# Patient Record
Sex: FEMALE | Race: BLACK OR AFRICAN AMERICAN | NOT HISPANIC OR LATINO | URBAN - METROPOLITAN AREA
[De-identification: names, ages, dates, MRNs, and addresses within clinical notes are randomized per-mention and may not be internally consistent; named-entity substitution may affect disease eponyms.]

---

## 2024-07-24 ENCOUNTER — TELEPHONE (OUTPATIENT)
Dept: ORTHOPEDIC SURGERY | Facility: CLINIC | Age: 59
End: 2024-07-24

## 2024-07-24 NOTE — TELEPHONE ENCOUNTER
Caller: JACE TENORIO  Relationship to patient: SELF  Best call back number: [phone]   Service:LANGUAGE  Is  needs updated in registration: YES  Date of Appointment:08/07/24  Time of Appointment:3:10 PM  Additional notes:PATIENT LANGUAGE IS SWAHILI

## 2024-08-07 ENCOUNTER — OFFICE VISIT (OUTPATIENT)
Dept: ORTHOPEDIC SURGERY | Facility: CLINIC | Age: 59
End: 2024-08-07
Payer: OTHER MISCELLANEOUS

## 2024-08-07 VITALS
WEIGHT: 152.4 LBS | SYSTOLIC BLOOD PRESSURE: 130 MMHG | DIASTOLIC BLOOD PRESSURE: 72 MMHG | HEIGHT: 63 IN | BODY MASS INDEX: 27 KG/M2

## 2024-08-07 DIAGNOSIS — M20.012 MALLET DEFORMITY OF LEFT LITTLE FINGER: ICD-10-CM

## 2024-08-07 DIAGNOSIS — M79.642 LEFT HAND PAIN: Primary | ICD-10-CM

## 2024-08-07 PROCEDURE — 99203 OFFICE O/P NEW LOW 30 MIN: CPT | Performed by: PLASTIC SURGERY

## 2024-08-07 NOTE — PROGRESS NOTES
[facility]     Office Visit       Date: 08/07/2024   Patient Name: Radha Smith  MRN: [id number]  YOB: 1965    Referring Physician: Leobardo Isaac PA-C     Chief Complaint:   Chief Complaint   Patient presents with    Left Hand - Pain     DOI: 03/25/2024       History of Present Illness:   Radha Smith is a 59 y.o. female hand dominant presents with a left small finger mallet deformity.  Patient was sustained a crush injury to her left small finger at work in March.  Noticed immediate extensor lag of the left small finger.  She presented to [place] [facility] was placed in the splint she splinted for approximately 2 months.  Reports that her pain associated the left small finger since resolved however she still has a slight extensor lag.  She has no other relevant medical history.  She denies smoking.      Subjective   Review of Systems:   Review of Systems   Constitutional: Negative.  Negative for chills, fatigue and fever.   HENT: Negative.  Negative for congestion and dental problem.    Eyes: Negative.  Negative for blurred vision.   Respiratory: Negative.  Negative for shortness of breath.    Cardiovascular: Negative.  Negative for leg swelling.   Gastrointestinal: Negative.  Negative for abdominal pain.   Endocrine: Negative.  Negative for polyuria.   Genitourinary: Negative.  Negative for difficulty urinating.   Musculoskeletal:  Positive for arthralgias.   Skin: Negative.    Allergic/Immunologic: Negative.    Neurological: Negative.    Hematological: Negative.  Negative for adenopathy.   Psychiatric/Behavioral: Negative.  Negative for behavioral problems.         Pertinent review of systems per HPI.     I reviewed the patient's chief complaint, history of present illness, review of systems, past medical history, surgical history, family history, social history, medications and allergy list in the EMR on  "08/07/2024 and agree with the findings above.    Objective    Vital Signs:   Vitals:    08/07/24 1523   BP: 130/72   Weight: 69.1 kg (152 lb 6.4 oz)   Height: 160 cm (63\")     BMI: Body mass index is 27 kg/m².    General Appearance: No acute distress. Alert and oriented.     Chest:  Non-labored breathing on room air. Regular rate and rhythm.    Upper Extremity Exam:    Left small finger with a 30 degree extensor lag at the DIP.  Nontender over the dorsal aspect of the DIP.  Patient has full flexion.  No evidence of swan-neck deformity.    Fingers are warm, well-perfused with appropriate capillary refill.  Palpable radial pulse.    Sensation intact to light touch in median, radial and ulnar nerve distributions.    Motor- Fires FPL, ulnar intrinsics, EPL/EDC w/ full active and passive range of motion. Strength intact.    Non-tender except for in the areas highlighted    Imaging/Studies:   Imaging Results (Last 24 Hours)       Procedure Component Value Units Date/Time    XR Hand 3+ View Left [[id number]] Resulted: 08/07/24 1540     Updated: 08/07/24 1540    Narrative:      Left Hand X-Ray    Indication: Pain    Views:  AP, Lateral, and Oblique     Comparison:  None    Findings:  Small dorsal avulsion fracture of the left small finger DIP with resulting   mallet deformity  No bony lesion  Normal soft tissues  Normal joint spaces    Impression:   Small bony mallet of the left small finger                  Procedures:  Procedures    Quality Measures:   ACP:   ACP discussion was deferred.    Tobacco:   Radha Smith  reports that she has never smoked. She has been exposed to tobacco smoke. She has never used smokeless tobacco.      Assessment / Plan    Assessment/Plan:     Diagnoses and all orders for this visit:    Left hand pain  -     XR Hand 3+ View Left         Radha Smithis a 59 y.o. female who presents with:      ICD-10-CM ICD-9-CM   1. Left hand pain  M79.642 729.5   2. Mallet deformity of left little finger  " M20.012 736.1         Patient presents with left small finger mallet deformity after a crush injury 5 months ago.  She was subsequently treated with 2 months of immobilization but still has a slight extensor lag.  She has no pain and no evidence of swan-neck deformity on exam.  We discussed her treatment options including tenodesis and pinning versus observation.  Given that the patient has no pain or functional loss would recommend continue observation with the patient agrees.  She may return to work with no restrictions.  Follow-up with me as needed with any concerns    Follow Up:   Return if symptoms worsen or fail to improve.        Eugene Mccracken MD  [facility]